# Patient Record
Sex: FEMALE | URBAN - METROPOLITAN AREA
[De-identification: names, ages, dates, MRNs, and addresses within clinical notes are randomized per-mention and may not be internally consistent; named-entity substitution may affect disease eponyms.]

---

## 2022-06-29 ENCOUNTER — APPOINTMENT (RX ONLY)
Dept: URBAN - METROPOLITAN AREA CLINIC 45 | Facility: CLINIC | Age: 22
Setting detail: DERMATOLOGY
End: 2022-06-29

## 2022-06-29 DIAGNOSIS — L60.8 OTHER NAIL DISORDERS: ICD-10-CM | Status: STABLE

## 2022-06-29 PROCEDURE — ? OBSERVATION AND MEASURE

## 2022-06-29 PROCEDURE — ? PHOTO-DOCUMENTATION

## 2022-06-29 PROCEDURE — ? TREATMENT REGIMEN

## 2022-06-29 PROCEDURE — ? COUNSELING

## 2022-06-29 PROCEDURE — 99203 OFFICE O/P NEW LOW 30 MIN: CPT

## 2022-06-29 ASSESSMENT — LOCATION ZONE DERM: LOCATION ZONE: FINGERNAIL

## 2022-06-29 ASSESSMENT — LOCATION DETAILED DESCRIPTION DERM: LOCATION DETAILED: RIGHT INDEX FINGERNAIL

## 2022-06-29 ASSESSMENT — LOCATION SIMPLE DESCRIPTION DERM: LOCATION SIMPLE: RIGHT INDEX FINGERNAIL

## 2022-06-29 NOTE — HPI: NAIL DISORDER
Is This A New Presentation, Or A Follow-Up?: Nail Disorder
How Severe Is It?: moderate
Additional History: Not changed in several years

## 2022-06-29 NOTE — PROCEDURE: TREATMENT REGIMEN
Detail Level: Zone
Plan: Unchanged x 5 + years, recommend continued monitoring and follow up for any changes noted sooner and PVU\\n\\nDiscussed impression and treatment plan with supervising physician Dr. HARLAN Chaparro and she agrees with the aforementioned assessment and plan.